# Patient Record
Sex: MALE | Race: WHITE | Employment: PART TIME | URBAN - METROPOLITAN AREA
[De-identification: names, ages, dates, MRNs, and addresses within clinical notes are randomized per-mention and may not be internally consistent; named-entity substitution may affect disease eponyms.]

---

## 2021-12-17 ENCOUNTER — HOSPITAL ENCOUNTER (EMERGENCY)
Age: 31
Discharge: HOME | End: 2021-12-17
Attending: EMERGENCY MEDICINE

## 2021-12-17 VITALS
BODY MASS INDEX: 35.07 KG/M2 | DIASTOLIC BLOOD PRESSURE: 83 MMHG | WEIGHT: 245 LBS | HEIGHT: 70 IN | SYSTOLIC BLOOD PRESSURE: 129 MMHG | TEMPERATURE: 98.2 F | HEART RATE: 60 BPM | OXYGEN SATURATION: 96 % | RESPIRATION RATE: 20 BRPM

## 2021-12-17 DIAGNOSIS — R00.2 PALPITATIONS: ICD-10-CM

## 2021-12-17 DIAGNOSIS — I10 HYPERTENSION, UNSPECIFIED TYPE: Primary | ICD-10-CM

## 2021-12-17 LAB
EKG ATRIAL RATE: 75 BPM
EKG P AXIS: 43 DEGREES
EKG P-R INTERVAL: 152 MS
EKG Q-T INTERVAL: 388 MS
EKG QRS DURATION: 92 MS
EKG QTC CALCULATION (BAZETT): 433 MS
EKG R AXIS: -17 DEGREES
EKG T AXIS: 7 DEGREES
EKG VENTRICULAR RATE: 75 BPM

## 2021-12-17 PROCEDURE — 93005 ELECTROCARDIOGRAM TRACING: CPT | Performed by: EMERGENCY MEDICINE

## 2021-12-17 PROCEDURE — 99283 EMERGENCY DEPT VISIT LOW MDM: CPT

## 2021-12-17 PROCEDURE — 93010 ELECTROCARDIOGRAM REPORT: CPT | Performed by: INTERNAL MEDICINE

## 2021-12-17 ASSESSMENT — ENCOUNTER SYMPTOMS
DIARRHEA: 0
SHORTNESS OF BREATH: 0
BACK PAIN: 0
CHEST TIGHTNESS: 0
NAUSEA: 0
SORE THROAT: 0
VOMITING: 0
ABDOMINAL PAIN: 0
COUGH: 0
WHEEZING: 0

## 2021-12-17 NOTE — ED PROVIDER NOTES
Chief complaint:  Palpitations    HPI history provided by the patient  Patient was in stating his blood pressure has been a little elevated and his heart rate seems to be little higher and he gets some intermittent palpitation sensations since having Covid almost a month ago. The symptoms have resolved, he actually has been working out, he takes [employer] drinks. He is going to the gym and exercising, has no chest pain, shortness of breath, lightheadedness or syncope. States he can just feel his heartbeat flip-flopping once in a while and it bothers him and he noted that his blood pressure was a little elevated for the last couple of weeks. No lightheadedness or syncope. No abdominal pain. No nausea vomiting or diarrhea. No leg pain or swelling. No extremity weakness or numbness. No treatment prior to arrival.    Review of Systems   Constitutional: Negative for chills, diaphoresis, fatigue and fever. HENT: Negative for congestion and sore throat. Respiratory: Negative for cough, chest tightness, shortness of breath and wheezing. Cardiovascular: Positive for palpitations. Negative for chest pain and leg swelling. Gastrointestinal: Negative for abdominal pain, diarrhea, nausea and vomiting. Genitourinary: Negative for dysuria, flank pain and frequency. Musculoskeletal: Negative for arthralgias, back pain, gait problem, joint swelling, myalgias, neck pain and neck stiffness. Skin: Negative for rash and wound. Neurological: Negative for dizziness, seizures, syncope, weakness, light-headedness, numbness and headaches. Hematological: Negative for adenopathy. All other systems reviewed and are negative. Physical Exam  Vitals and nursing note reviewed. Constitutional:       General: He is not in acute distress. Appearance: He is well-developed. He is not ill-appearing, toxic-appearing or diaphoretic. HENT:      Head: Normocephalic and atraumatic.    Eyes:      General: No scleral icterus. Pupils: Pupils are equal, round, and reactive to light. Cardiovascular:      Rate and Rhythm: Normal rate and regular rhythm. Heart sounds: Normal heart sounds. No murmur heard. Pulmonary:      Effort: Pulmonary effort is normal. No respiratory distress. Breath sounds: Normal breath sounds. No stridor, decreased air movement or transmitted upper airway sounds. No decreased breath sounds, wheezing, rhonchi or rales. Chest:      Chest wall: No tenderness. Abdominal:      General: Bowel sounds are normal. There is no distension. Palpations: Abdomen is soft. There is no pulsatile mass. Tenderness: There is no abdominal tenderness. There is no right CVA tenderness, left CVA tenderness, guarding or rebound. Musculoskeletal:         General: No swelling, tenderness, deformity or signs of injury. Cervical back: Normal range of motion and neck supple. Right lower leg: No edema. Left lower leg: No edema. Comments: Arms legs are neurovascular intact. No pretibial edema or calf pain. Skin:     General: Skin is warm and dry. Coloration: Skin is not cyanotic, jaundiced, mottled or pale. Findings: No erythema or rash. Neurological:      General: No focal deficit present. Mental Status: He is alert and oriented to person, place, and time. GCS: GCS eye subscore is 4. GCS verbal subscore is 5. GCS motor subscore is 6. Cranial Nerves: No cranial nerve deficit. Coordination: Coordination normal.   Psychiatric:         Mood and Affect: Mood is anxious.           Procedures     MDM        EKG Interpretation    Interpreted by emergency department physician    Rhythm: normal sinus   Rate: 75  Axis: normal  Ectopy: none  Conduction: normal  ST Segments: normal  T Waves: normal  Q Waves: none    Clinical Impression: no acute changes    Marlen Sullivan DO            --------------------------------------------- PAST HISTORY ---------------------------------------------  Past Medical History:  has no past medical history on file. Past Surgical History:  has no past surgical history on file. Social History:  reports that he has never smoked. He has never used smokeless tobacco. He reports that he does not use drugs. Family History: family history is not on file. The patients home medications have been reviewed. Allergies: Patient has no known allergies. -------------------------------------------------- RESULTS -------------------------------------------------  Labs:  No results found for this visit on 12/17/21. Radiology:  No orders to display       ------------------------- NURSING NOTES AND VITALS REVIEWED ---------------------------  Date / Time Roomed:  12/17/2021  1:44 AM  ED Bed Assignment:  17/17    The nursing notes within the ED encounter and vital signs as below have been reviewed. BP (!) 160/93   Pulse 89   Temp 98.2 °F (36.8 °C)   Resp 20   Ht 5' 10\" (1.778 m)   Wt 245 lb (111.1 kg)   SpO2 98%   BMI 35.15 kg/m²   Oxygen Saturation Interpretation: Normal      ------------------------------------------ PROGRESS NOTES ------------------------------------------  I have spoken with the patient and discussed todays results, in addition to providing specific details for the plan of care and counseling regarding the diagnosis and prognosis. Their questions are answered at this time and they are agreeable with the plan. I discussed at length with them reasons for immediate return here for re evaluation. They will followup with primary care by calling their office tomorrow. --------------------------------- ADDITIONAL PROVIDER NOTES ---------------------------------  At this time the patient is without objective evidence of an acute process requiring hospitalization or inpatient management.   They have remained hemodynamically stable throughout their entire ED visit and are stable for discharge with outpatient follow-up. The plan has been discussed in detail and they are aware of the specific conditions for emergent return, as well as the importance of follow-up. New Prescriptions    No medications on file       Diagnosis:  1. Hypertension, unspecified type    2. Palpitations        Disposition:  Patient's disposition: Discharge to home  Patient's condition is stable.          Jerry Oh DO  12/17/21 0215

## 2022-03-04 ENCOUNTER — HOSPITAL ENCOUNTER (EMERGENCY)
Age: 32
Discharge: HOME | End: 2022-03-05
Attending: EMERGENCY MEDICINE
Payer: OTHER GOVERNMENT

## 2022-03-04 ENCOUNTER — APPOINTMENT (OUTPATIENT)
Dept: GENERAL RADIOLOGY | Age: 32
End: 2022-03-04
Payer: OTHER GOVERNMENT

## 2022-03-04 DIAGNOSIS — R07.9 CHEST PAIN, UNSPECIFIED TYPE: Primary | ICD-10-CM

## 2022-03-04 LAB
ANION GAP SERPL CALCULATED.3IONS-SCNC: 13 MMOL/L (ref 7–16)
BASOPHILS ABSOLUTE: 0.04 E9/L (ref 0–0.2)
BASOPHILS RELATIVE PERCENT: 0.4 % (ref 0–2)
BUN BLDV-MCNC: 17 MG/DL (ref 6–20)
CALCIUM SERPL-MCNC: 9.4 MG/DL (ref 8.6–10.2)
CHLORIDE BLD-SCNC: 102 MMOL/L (ref 98–107)
CO2: 22 MMOL/L (ref 22–29)
CREAT SERPL-MCNC: 1.2 MG/DL (ref 0.7–1.2)
EOSINOPHILS ABSOLUTE: 0.42 E9/L (ref 0.05–0.5)
EOSINOPHILS RELATIVE PERCENT: 4.4 % (ref 0–6)
GFR AFRICAN AMERICAN: >60
GFR NON-AFRICAN AMERICAN: >60 ML/MIN/1.73
GLUCOSE BLD-MCNC: 117 MG/DL (ref 74–99)
HCT VFR BLD CALC: 47.3 % (ref 37–54)
HEMOGLOBIN: 16.1 G/DL (ref 12.5–16.5)
IMMATURE GRANULOCYTES #: 0.03 E9/L
IMMATURE GRANULOCYTES %: 0.3 % (ref 0–5)
LYMPHOCYTES ABSOLUTE: 2.45 E9/L (ref 1.5–4)
LYMPHOCYTES RELATIVE PERCENT: 25.5 % (ref 20–42)
MCH RBC QN AUTO: 31.9 PG (ref 26–35)
MCHC RBC AUTO-ENTMCNC: 34 % (ref 32–34.5)
MCV RBC AUTO: 93.7 FL (ref 80–99.9)
MONOCYTES ABSOLUTE: 0.55 E9/L (ref 0.1–0.95)
MONOCYTES RELATIVE PERCENT: 5.7 % (ref 2–12)
NEUTROPHILS ABSOLUTE: 6.11 E9/L (ref 1.8–7.3)
NEUTROPHILS RELATIVE PERCENT: 63.7 % (ref 43–80)
PDW BLD-RTO: 12.3 FL (ref 11.5–15)
PLATELET # BLD: 238 E9/L (ref 130–450)
PMV BLD AUTO: 11.3 FL (ref 7–12)
POTASSIUM REFLEX MAGNESIUM: 3.7 MMOL/L (ref 3.5–5)
PRO-BNP: 11 PG/ML (ref 0–125)
RBC # BLD: 5.05 E12/L (ref 3.8–5.8)
SODIUM BLD-SCNC: 137 MMOL/L (ref 132–146)
TROPONIN, HIGH SENSITIVITY: <6 NG/L (ref 0–11)
WBC # BLD: 9.6 E9/L (ref 4.5–11.5)

## 2022-03-04 PROCEDURE — 83880 ASSAY OF NATRIURETIC PEPTIDE: CPT

## 2022-03-04 PROCEDURE — 71045 X-RAY EXAM CHEST 1 VIEW: CPT

## 2022-03-04 PROCEDURE — 80048 BASIC METABOLIC PNL TOTAL CA: CPT

## 2022-03-04 PROCEDURE — 36415 COLL VENOUS BLD VENIPUNCTURE: CPT

## 2022-03-04 PROCEDURE — 85025 COMPLETE CBC W/AUTO DIFF WBC: CPT

## 2022-03-04 PROCEDURE — 99283 EMERGENCY DEPT VISIT LOW MDM: CPT

## 2022-03-04 PROCEDURE — 93005 ELECTROCARDIOGRAM TRACING: CPT | Performed by: STUDENT IN AN ORGANIZED HEALTH CARE EDUCATION/TRAINING PROGRAM

## 2022-03-04 PROCEDURE — 84484 ASSAY OF TROPONIN QUANT: CPT

## 2022-03-04 RX ORDER — HYDROXYZINE PAMOATE 25 MG/1
25 CAPSULE ORAL 3 TIMES DAILY PRN
Qty: 21 CAPSULE | Refills: 0 | Status: SHIPPED | OUTPATIENT
Start: 2022-03-04 | End: 2022-03-11

## 2022-03-05 VITALS
RESPIRATION RATE: 18 BRPM | HEART RATE: 71 BPM | DIASTOLIC BLOOD PRESSURE: 89 MMHG | SYSTOLIC BLOOD PRESSURE: 141 MMHG | OXYGEN SATURATION: 97 % | TEMPERATURE: 96.4 F

## 2022-03-05 LAB
EKG ATRIAL RATE: 84 BPM
EKG P AXIS: 52 DEGREES
EKG P-R INTERVAL: 158 MS
EKG Q-T INTERVAL: 396 MS
EKG QRS DURATION: 96 MS
EKG QTC CALCULATION (BAZETT): 467 MS
EKG R AXIS: 0 DEGREES
EKG T AXIS: 14 DEGREES
EKG VENTRICULAR RATE: 84 BPM

## 2022-03-05 ASSESSMENT — ENCOUNTER SYMPTOMS
SORE THROAT: 0
NAUSEA: 0
SHORTNESS OF BREATH: 0
BACK PAIN: 0
PHOTOPHOBIA: 0
COUGH: 0
ABDOMINAL PAIN: 0

## 2022-03-05 NOTE — ED PROVIDER NOTES
20-year-old male with no significant past medical history presenting for chest pain. Patient states he has been feeling short chest pain intermittently for couple weeks, but worse in the last week. There have been no exacerbating or relieving factors. He also complains of feeling some discomfort in his left shoulder and that his left hand occasionally falls asleep as well. He denies any fever, chills, cough, congestion, shortness of breath, recent travel, recent surgery, or history of blood clots. Review of Systems   Constitutional: Negative for chills and fever. HENT: Negative for congestion and sore throat. Eyes: Negative for photophobia and visual disturbance. Respiratory: Negative for cough and shortness of breath. Cardiovascular: Positive for chest pain. Negative for leg swelling. Gastrointestinal: Negative for abdominal pain and nausea. Genitourinary: Negative for dysuria and flank pain. Musculoskeletal: Positive for myalgias. Negative for back pain. Skin: Negative for rash and wound. Neurological: Positive for numbness. Negative for light-headedness and headaches. All other systems reviewed and are negative. Physical Exam  Constitutional:       General: He is not in acute distress. Appearance: He is not ill-appearing. HENT:      Head: Normocephalic and atraumatic. Right Ear: External ear normal.      Left Ear: External ear normal.      Nose: Nose normal.   Eyes:      Conjunctiva/sclera: Conjunctivae normal.   Cardiovascular:      Rate and Rhythm: Normal rate and regular rhythm. Pulmonary:      Effort: Pulmonary effort is normal.      Breath sounds: Normal breath sounds. Abdominal:      General: There is no distension. Palpations: Abdomen is soft. Musculoskeletal:         General: No swelling or deformity. Skin:     General: Skin is warm and dry. Neurological:      General: No focal deficit present. Mental Status: He is alert. Sensory: No sensory deficit. Motor: No weakness. Comments: Sensation to touch intact left upper extremity   Psychiatric:         Mood and Affect: Mood normal.         Behavior: Behavior normal.          Procedures     MDM  Number of Diagnoses or Management Options  Chest pain, unspecified type  Diagnosis management comments: 78-year-old male presenting for intermittent chest pain. EKG unremarkable. Troponin negative. Chest x-ray unremarkable. Labs otherwise unremarkable. PERC negative and heart score 0. At this time, patient is felt stable for discharge with outpatient follow-up. He does admit to anxiety and so was prescribed Vistaril. He was discharged home in stable condition with referral to [facility] primary care instructed to follow-up outpatient. Amount and/or Complexity of Data Reviewed  Clinical lab tests: reviewed  Tests in the radiology section of CPT®: reviewed  Tests in the medicine section of CPT®: reviewed                    --------------------------------------------- PAST HISTORY ---------------------------------------------  Past Medical History:  has no past medical history on file. Past Surgical History:  has no past surgical history on file. Social History:  reports that he has never smoked. He has never used smokeless tobacco. He reports that he does not use drugs. Family History: family history is not on file. The patients home medications have been reviewed. Allergies: Patient has no known allergies.     -------------------------------------------------- RESULTS -------------------------------------------------  Labs:  Results for orders placed or performed during the hospital encounter of 03/04/22   CBC with Auto Differential   Result Value Ref Range    WBC 9.6 4.5 - 11.5 E9/L    RBC 5.05 3.80 - 5.80 E12/L    Hemoglobin 16.1 12.5 - 16.5 g/dL    Hematocrit 47.3 37.0 - 54.0 %    MCV 93.7 80.0 - 99.9 fL    MCH 31.9 26.0 - 35.0 pg    MCHC 34.0 32.0 - 34.5 % RDW 12.3 11.5 - 15.0 fL    Platelets 738 175 - 367 E9/L    MPV 11.3 7.0 - 12.0 fL    Neutrophils % 63.7 43.0 - 80.0 %    Immature Granulocytes % 0.3 0.0 - 5.0 %    Lymphocytes % 25.5 20.0 - 42.0 %    Monocytes % 5.7 2.0 - 12.0 %    Eosinophils % 4.4 0.0 - 6.0 %    Basophils % 0.4 0.0 - 2.0 %    Neutrophils Absolute 6.11 1.80 - 7.30 E9/L    Immature Granulocytes # 0.03 E9/L    Lymphocytes Absolute 2.45 1.50 - 4.00 E9/L    Monocytes Absolute 0.55 0.10 - 0.95 E9/L    Eosinophils Absolute 0.42 0.05 - 0.50 E9/L    Basophils Absolute 0.04 0.00 - 0.20 E0/A   Basic Metabolic Panel w/ Reflex to MG   Result Value Ref Range    Sodium 137 132 - 146 mmol/L    Potassium reflex Magnesium 3.7 3.5 - 5.0 mmol/L    Chloride 102 98 - 107 mmol/L    CO2 22 22 - 29 mmol/L    Anion Gap 13 7 - 16 mmol/L    Glucose 117 (H) 74 - 99 mg/dL    BUN 17 6 - 20 mg/dL    CREATININE 1.2 0.7 - 1.2 mg/dL    GFR Non-African American >60 >=60 mL/min/1.73    GFR African American >60     Calcium 9.4 8.6 - 10.2 mg/dL   Troponin   Result Value Ref Range    Troponin, High Sensitivity <6 0 - 11 ng/L   Brain Natriuretic Peptide   Result Value Ref Range    Pro-BNP 11 0 - 125 pg/mL   EKG 12 Lead   Result Value Ref Range    Ventricular Rate 84 BPM    Atrial Rate 84 BPM    P-R Interval 158 ms    QRS Duration 96 ms    Q-T Interval 396 ms    QTc Calculation (Bazett) 467 ms    P Axis 52 degrees    R Axis 0 degrees    T Axis 14 degrees       Radiology:  XR CHEST PORTABLE   Final Result   No evidence of active cardiopulmonary pathology.            EKG: interpreted by me  Rate: 84  Rhythm: sinus  Interpretation: no acute ST elevations or depressions, no acute T wave changes, normal intervals  Comparison: stable compared to previous    ------------------------- NURSING NOTES AND VITALS REVIEWED ---------------------------  Date / Time Roomed:  3/4/2022 10:37 PM  ED Bed Assignment:  BENJAMIN/BENJAMIN    The nursing notes within the ED encounter and vital signs as below have been reviewed. BP (!) 141/89   Pulse 71   Temp 96.4 °F (35.8 °C) (Temporal)   Resp 18   SpO2 97%   Oxygen Saturation Interpretation: Normal      ------------------------------------------ PROGRESS NOTES ------------------------------------------    I have spoken with the patient and discussed todays results, in addition to providing specific details for the plan of care and counseling regarding the diagnosis and prognosis. Their questions are answered at this time and they are agreeable with the plan. I discussed at length with them reasons for immediate return here for re evaluation. They will followup with their primary care physician by calling their office on Monday.      --------------------------------- ADDITIONAL PROVIDER NOTES ---------------------------------  At this time the patient is without objective evidence of an acute process requiring hospitalization or inpatient management. They have remained hemodynamically stable throughout their entire ED visit and are stable for discharge with outpatient follow-up. The plan has been discussed in detail and they are aware of the specific conditions for emergent return, as well as the importance of follow-up. New Prescriptions    HYDROXYZINE (VISTARIL) 25 MG CAPSULE    Take 1 capsule by mouth 3 times daily as needed for Anxiety       Diagnosis:  1. Chest pain, unspecified type        Disposition:  Patient's disposition: Discharge to home  Patient's condition is stable.             Boom Sharma MD  Resident  03/05/22 5976

## 2022-03-05 NOTE — ED NOTES
Pt discharged to home at this time. Pt is A&OX4 , has all belongings. Discharge instructions and medications have been reviewed, IV removed. Pt has verbalized an understanding of all instructions relayed by RN. Pt ambulated out of ED w/steady and stable gait.       Kiara North RN  03/05/22 0828

## 2024-01-04 ENCOUNTER — TELEPHONE (OUTPATIENT)
Dept: ORTHOPEDIC SURGERY | Facility: CLINIC | Age: 34
End: 2024-01-04

## 2025-01-11 ENCOUNTER — OFFICE VISIT (OUTPATIENT)
Dept: URGENT CARE | Facility: URGENT CARE | Age: 35
End: 2025-01-11
Payer: COMMERCIAL

## 2025-01-11 VITALS
TEMPERATURE: 98.6 F | WEIGHT: 221.34 LBS | BODY MASS INDEX: 31.76 KG/M2 | RESPIRATION RATE: 18 BRPM | OXYGEN SATURATION: 96 % | SYSTOLIC BLOOD PRESSURE: 113 MMHG | DIASTOLIC BLOOD PRESSURE: 80 MMHG | HEART RATE: 76 BPM

## 2025-01-11 DIAGNOSIS — R11.0 NAUSEA: ICD-10-CM

## 2025-01-11 DIAGNOSIS — A08.4 VIRAL GASTROENTERITIS: Primary | ICD-10-CM

## 2025-01-11 RX ORDER — ONDANSETRON 4 MG/1
4 TABLET, ORALLY DISINTEGRATING ORAL ONCE
Status: COMPLETED | OUTPATIENT
Start: 2025-01-11 | End: 2025-01-11

## 2025-01-11 RX ORDER — ONDANSETRON 4 MG/1
4 TABLET, ORALLY DISINTEGRATING ORAL EVERY 8 HOURS PRN
Qty: 12 TABLET | Refills: 0 | Status: SHIPPED | OUTPATIENT
Start: 2025-01-11 | End: 2025-01-15

## 2025-01-11 RX ORDER — TIRZEPATIDE 12.5 MG/.5ML
INJECTION, SOLUTION SUBCUTANEOUS
COMMUNITY

## 2025-01-11 RX ORDER — LISINOPRIL 10 MG/1
10 TABLET ORAL
COMMUNITY
Start: 2024-11-12 | End: 2025-11-12

## 2025-01-11 RX ORDER — SERTRALINE HYDROCHLORIDE 50 MG/1
75 TABLET, FILM COATED ORAL
COMMUNITY
Start: 2024-04-30

## 2025-01-11 RX ADMIN — ONDANSETRON 4 MG: 4 TABLET, ORALLY DISINTEGRATING ORAL at 12:02

## 2025-01-11 ASSESSMENT — ENCOUNTER SYMPTOMS
ABDOMINAL PAIN: 1
VOMITING: 1
DIARRHEA: 1

## 2025-01-11 NOTE — PROGRESS NOTES
Subjective   Patient ID: Choco Alfaro is a 35 y.o. male. They present today with a chief complaint of Vomiting, Diarrhea (4 days of symptoms. Unable to eat or keep food down. Epigastric with lower abdominal pain, intermittently. ), Abdominal Pain, and Nausea.    History of Present Illness    Vomiting  Associated symptoms: abdominal pain and diarrhea    Diarrhea  Associated symptoms: abdominal pain and vomiting    Abdominal Pain  Associated symptoms include diarrhea and vomiting.     Pt reports vomiting 3 times in last 4 days, he reports stooling 10 times a day foul smelling without blood, mostly liquid green. Pt reports mild abdominal cramping prior to stooling. No fever. No cough or congestion. Pt denies any sick contacts. Pt voided 1-2 times today. He is able to pass gas. He is trying to drink liquids he has nausea. Pt had cholecystectomy 5-6 years ago. Pt has tried otc immodium last dose 5 am with temporary relief. Pt has eggs every morning and chicken sausage. No recent antibiotic. Pt is on zepbound injection once a week for weight loss, reports constipation.     Past Medical History  Allergies as of 01/11/2025    (No Known Allergies)       (Not in a hospital admission)       No past medical history on file.    Past Surgical History:   Procedure Laterality Date    OTHER SURGICAL HISTORY  06/21/2019    Knee surgery        reports that he has never smoked. His smokeless tobacco use includes chew.    Review of Systems  Review of Systems   Gastrointestinal:  Positive for abdominal pain, diarrhea and vomiting.                                  Objective    Vitals:    01/11/25 1132   BP: 113/80   BP Location: Left arm   Patient Position: Sitting   BP Cuff Size: Adult   Pulse: 76   Resp: 18   Temp: 37 °C (98.6 °F)   TempSrc: Oral   SpO2: 96%   Weight: 100 kg (221 lb 5.5 oz)     No LMP for male patient.    Physical Exam  Constitutional:       Appearance: Normal appearance.   HENT:      Mouth/Throat:      Mouth:  Mucous membranes are moist.   Cardiovascular:      Rate and Rhythm: Normal rate and regular rhythm.      Heart sounds: No murmur heard.  Pulmonary:      Effort: Pulmonary effort is normal.      Breath sounds: Normal breath sounds.   Abdominal:      General: Abdomen is flat. Bowel sounds are normal. There is no distension.      Palpations: Abdomen is soft.      Tenderness: There is no abdominal tenderness. There is no right CVA tenderness, left CVA tenderness or guarding.   Skin:     General: Skin is warm and dry.      Findings: No rash.   Neurological:      General: No focal deficit present.      Mental Status: He is alert.         Procedures    Point of Care Test & Imaging Results from this visit  No results found for this visit on 01/11/25.   No results found.    Diagnostic study results (if any) were reviewed by Suzy Martines PA-C.    Assessment/Plan   Allergies, medications, history, and pertinent labs/EKGs/Imaging reviewed by Suzy Martines PA-C.     Medical Decision Making  35 y.o. male. present today with a chief complaint of Vomiting, Diarrhea (4 days of symptoms. Unable to eat or keep food down. Epigastric with lower abdominal pain, intermittently. ), Abdominal Pain, and Nausea.  Reviewed vitals stable. Exam remarkable for well appearing, no abdominal tenderness.    Discussed Acute viral gastroenteritis with nausea- zofran 4mg once now.; tolerated sips of water without vomiting. Continue zofran 4mg every 8 hr as needed for nausea. Hydration with Pedialyte or water. [place] diet green banana, jello, apple sauce, soup, yogurt or probiotic daily. Advance diet as tolerated. Heat compresses to abdomen 2-3 times a day as needed. Tylenol 500mg every 6hr for pain as needed.     For loose stools, please buy over the counter Immodium-AD (Loperamide): Take 2 pills (4 mg total) after the first loose stool. After each additional loose stool occurs, take 1 pill (2 mg). Maximum of 4 pills (8 mg) in 24  hours.      If your symptoms are not improving or worsening, please go to the ER for rehydration and evaluation.    Follow up with your primary provider within the next 7 days      Orders and Diagnoses  Diagnoses and all orders for this visit:  Viral gastroenteritis  Nausea  -     ondansetron ODT (Zofran-ODT) 4 mg disintegrating tablet; Dissolve 1 tablet (4 mg) in the mouth every 8 hours if needed for nausea or vomiting for up to 4 days.  -     ondansetron ODT (Zofran-ODT) disintegrating tablet 4 mg      Medical Admin Record  Administrations This Visit       ondansetron ODT (Zofran-ODT) disintegrating tablet 4 mg       Admin Date  01/11/2025 Action  Given Dose  4 mg Route  oral Documented By  Bambi Chow MA                    Patient disposition: Home    Electronically signed by Suzy Martines PA-C  7:30 PM

## 2025-01-11 NOTE — PATIENT INSTRUCTIONS
Acute viral gastroenteritis with nausea- zofran 4mg once now. Continue zofran 4mg every 8 hr as needed for nausea. Hydration with Pedialyte or water. [place] diet green banana, jello, apple sauce, soup, yogurt or probiotic daily. Advance diet as tolerated. Heat compresses to abdomen 2-3 times a day as needed. Tylenol 500mg every 6hr for pain as needed.     For loose stools, please buy over the counter Immodium-AD (Loperamide): Take 2 pills (4 mg total) after the first loose stool. After each additional loose stool occurs, take 1 pill (2 mg). Maximum of 4 pills (8 mg) in 24 hours.      If your symptoms are not improving or worsening, please go to the ER for rehydration and evaluation.    Follow up with your primary provider within the next 7 days

## 2025-02-23 ENCOUNTER — CLINICAL SUPPORT (OUTPATIENT)
Dept: URGENT CARE | Facility: URGENT CARE | Age: 35
End: 2025-02-23
Payer: COMMERCIAL

## 2025-02-23 VITALS
WEIGHT: 221.78 LBS | DIASTOLIC BLOOD PRESSURE: 83 MMHG | SYSTOLIC BLOOD PRESSURE: 128 MMHG | BODY MASS INDEX: 31.82 KG/M2 | TEMPERATURE: 98.4 F | RESPIRATION RATE: 16 BRPM | OXYGEN SATURATION: 97 % | HEART RATE: 86 BPM

## 2025-02-23 DIAGNOSIS — H65.03 BILATERAL ACUTE SEROUS OTITIS MEDIA, RECURRENCE NOT SPECIFIED: Primary | ICD-10-CM

## 2025-02-23 DIAGNOSIS — J01.00 ACUTE MAXILLARY SINUSITIS, RECURRENCE NOT SPECIFIED: ICD-10-CM

## 2025-02-23 PROBLEM — F90.0 ATTENTION DEFICIT HYPERACTIVITY DISORDER, PREDOMINANTLY INATTENTIVE TYPE: Status: ACTIVE | Noted: 2025-02-23

## 2025-02-23 PROBLEM — Z77.29 EXPOSURE TO POTENTIALLY HAZARDOUS SUBSTANCE: Status: ACTIVE | Noted: 2025-02-23

## 2025-02-23 PROBLEM — K80.10 CALCULUS OF GALLBLADDER WITH CHRONIC CHOLECYSTITIS WITHOUT OBSTRUCTION: Status: ACTIVE | Noted: 2025-02-23

## 2025-02-23 PROCEDURE — 99070 SPECIAL SUPPLIES PHYS/QHP: CPT | Performed by: FAMILY MEDICINE

## 2025-02-23 PROCEDURE — 99214 OFFICE O/P EST MOD 30 MIN: CPT | Performed by: FAMILY MEDICINE

## 2025-02-23 RX ORDER — AMOXICILLIN 875 MG/1
875 TABLET, FILM COATED ORAL 2 TIMES DAILY
Qty: 20 TABLET | Refills: 0 | Status: SHIPPED | OUTPATIENT
Start: 2025-02-23 | End: 2025-03-05

## 2025-02-23 NOTE — PATIENT INSTRUCTIONS
You have bilateral acute otitis media or middle ear infection.  Please increase your oral fluids for the next 7-10 days  Please take amoxicillin as prescribed  I recommend the use of probiotics while taking antibiotic and for an additional 7-10 days after you've completed treatment. Please ask your pharmacist for advice on appropriate product for this purpose  May take ibuprofen 200mg, 2 tablets by mouth every 8 hours with food for discomfort.  May use Tylenol 325 mg, one or 2 tablets by mouth every 4-6 hours as needed for additional pain relief.   If no better in 5-7 days please follow-up with primary care provider.   If fever greater than 102 degrees Fahrenheit, chills, nausea, vomiting, bleeding from ears, drainage from ears, increased difficulty breathing, difficulty swallowing, increased wheezing, shortness of breath please go immediately to emergency room for further evaluation.    This note was generated by voice recognition software. Minor transcription/grammatical errors may be present. Please call for clarification.

## 2025-02-24 ASSESSMENT — ENCOUNTER SYMPTOMS
DYSURIA: 0
CONSTIPATION: 0
FREQUENCY: 0
NAUSEA: 0
RHINORRHEA: 1
SINUS PRESSURE: 0
CHILLS: 0
SHORTNESS OF BREATH: 0
HEADACHES: 1
SORE THROAT: 1
VOMITING: 0
FEVER: 0
WHEEZING: 0
PALPITATIONS: 0
SINUS COMPLAINT: 1
CHEST TIGHTNESS: 0
ABDOMINAL PAIN: 0
DIARRHEA: 0
COUGH: 1

## 2025-02-24 NOTE — PROGRESS NOTES
"Subjective   Patient ID: Choco Alfaro is a 35 y.o. male.    HPI: 35-year-old male presents with \"sinus problems\" x 1 week.      History provided by:  Patient   used: No    Sinus Problem  Associated symptoms: congestion, cough (Slightly productive), ear pain, headaches, rhinorrhea and sore throat (Secondary to cough)    Associated symptoms: no abdominal pain, no diarrhea, no fever, no nausea, no shortness of breath, no vomiting and no wheezing        The following portions of the chart were reviewed this encounter and updated as appropriate:  Tobacco  Allergies  Meds  Problems  Med Hx  Surg Hx  Fam Hx         Review of Systems   Constitutional:  Negative for chills and fever.   HENT:  Positive for congestion, ear pain, rhinorrhea and sore throat (Secondary to cough). Negative for sinus pressure.    Respiratory:  Positive for cough (Slightly productive). Negative for chest tightness, shortness of breath and wheezing.    Cardiovascular:  Negative for palpitations.   Gastrointestinal:  Negative for abdominal pain, constipation, diarrhea, nausea and vomiting.   Genitourinary:  Negative for dysuria and frequency.   Neurological:  Positive for headaches.     Objective   Physical Exam  Vital signs are reviewed. Alert and oriented x3 with normal mood and affect  Patient is well nourished, well-developed, alert and in no acute distress  Tender to palpation over frontal and maxillary sinus areas    External eyes, orbits, conjunctiva and eyelids are normal in appearance  Pupils are equal, round, reactive to light and accommodation, extraocular movements intact    External ears appear normal  External canals are normal in appearance  Right tympanic membrane is intact and pale gray in appearance  Left tympanic membrane is intact and pale gray in appearance  There is no middle ear effusion noted on the right  There is no middle ear effusion noted on the left  External appearance of the nose is " normal  Nasal mucosa, septum, turbinates are moderate reddened and moderately swollen in appearance  There is thick yellow with slight crusting nasal discharge in both nares    Oral mucosa is uniformly pink and moist  Palate is pink, symmetric and intact  Tongue is moist, mobile and midline  Posterior pharynx mildly erythematous with no concretions or exudates present  No cervical lymphadenopathy palpated    Heart has regular rate and rhythm. No murmurs, rubs or gallops are auscultated at this exam.    Respiratory rate rhythm and effort are normal. Breath sounds bilaterally are clear on auscultation without crackles, rhonchi, wheezes or friction rub.    Abdomen: Normal bowel sounds on auscultation. Soft, nontender without rebound or rigidity on palpation  Procedures    Assessment/Plan   Diagnoses and all orders for this visit:  Bilateral acute serous otitis media, recurrence not specified  -     amoxicillin (Amoxil) 875 mg tablet; Take 1 tablet (875 mg) by mouth 2 times a day for 10 days.  Acute maxillary sinusitis, recurrence not specified  -     amoxicillin (Amoxil) 875 mg tablet; Take 1 tablet (875 mg) by mouth 2 times a day for 10 days.    Patient disposition: Home